# Patient Record
Sex: FEMALE | Race: WHITE | Employment: STUDENT | URBAN - METROPOLITAN AREA
[De-identification: names, ages, dates, MRNs, and addresses within clinical notes are randomized per-mention and may not be internally consistent; named-entity substitution may affect disease eponyms.]

---
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
## 2025-03-01 ENCOUNTER — OFFICE VISIT (OUTPATIENT)
Dept: URGENT CARE | Facility: URGENT CARE | Age: 20
End: 2025-03-01
Payer: MEDICAID

## 2025-03-01 VITALS
WEIGHT: 155 LBS | OXYGEN SATURATION: 98 % | DIASTOLIC BLOOD PRESSURE: 71 MMHG | HEART RATE: 76 BPM | SYSTOLIC BLOOD PRESSURE: 105 MMHG | HEIGHT: 67 IN | RESPIRATION RATE: 18 BRPM | BODY MASS INDEX: 24.33 KG/M2 | TEMPERATURE: 99.2 F

## 2025-03-01 DIAGNOSIS — R42 DIZZINESS: Primary | ICD-10-CM

## 2025-03-01 DIAGNOSIS — R42 VERTIGO: ICD-10-CM

## 2025-03-01 LAB
ERYTHROCYTE [DISTWIDTH] IN BLOOD BY AUTOMATED COUNT: 14 % (ref 10–15)
HCT VFR BLD AUTO: 43.8 % (ref 35–47)
HGB BLD-MCNC: 14.3 G/DL (ref 11.7–15.7)
MCH RBC QN AUTO: 26.9 PG (ref 26.5–33)
MCHC RBC AUTO-ENTMCNC: 32.6 G/DL (ref 31.5–36.5)
MCV RBC AUTO: 83 FL (ref 78–100)
PLATELET # BLD AUTO: 344 10E3/UL (ref 150–450)
RBC # BLD AUTO: 5.31 10E6/UL (ref 3.8–5.2)
WBC # BLD AUTO: 8.1 10E3/UL (ref 4–11)

## 2025-03-01 PROCEDURE — 3078F DIAST BP <80 MM HG: CPT | Performed by: FAMILY MEDICINE

## 2025-03-01 PROCEDURE — 36415 COLL VENOUS BLD VENIPUNCTURE: CPT | Performed by: FAMILY MEDICINE

## 2025-03-01 PROCEDURE — 99214 OFFICE O/P EST MOD 30 MIN: CPT | Performed by: FAMILY MEDICINE

## 2025-03-01 PROCEDURE — 84443 ASSAY THYROID STIM HORMONE: CPT | Performed by: FAMILY MEDICINE

## 2025-03-01 PROCEDURE — 85027 COMPLETE CBC AUTOMATED: CPT | Performed by: FAMILY MEDICINE

## 2025-03-01 PROCEDURE — 93000 ELECTROCARDIOGRAM COMPLETE: CPT | Performed by: FAMILY MEDICINE

## 2025-03-01 PROCEDURE — 3074F SYST BP LT 130 MM HG: CPT | Performed by: FAMILY MEDICINE

## 2025-03-01 PROCEDURE — 80048 BASIC METABOLIC PNL TOTAL CA: CPT | Performed by: FAMILY MEDICINE

## 2025-03-01 NOTE — PROGRESS NOTES
"  Assessment & Plan     Dizziness  19-year-old female presented with lightheadedness, dizziness which she has been experiencing for last 3 days.  No systemic symptoms.  Physical examination as described.  Differentials in detail including psychological, metabolic, cardiovascular and neurological etiology.  CBC and EKG unremarkable, no orthostatic blood pressure drop.  BMP ordered for further evaluation.  Shared decision made to continue conservative management for now.  Instructed to go ER if symptoms worsen, otherwise follow-up with PCP next week..  Patient, mother understood and in agreement with above plan.  All questions answered.  - CBC with platelets; Future  - EKG 12-lead complete w/read - Clinics  - Basic metabolic panel  (Ca, Cl, CO2, Creat, Gluc, K, Na, BUN); Future  - EKG 12-lead complete w/read - Clinics  - CBC with platelets  - Basic metabolic panel  (Ca, Cl, CO2, Creat, Gluc, K, Na, BUN)      Angel Guy is a 19 year old, presenting for the following health issues:  Urgent Care (Pt in clinic to have eval for dizziness lasting 3 days.)    HPI      Concern -   Onset: 3 days   Description: feeling dizzy and lightheaded   Intensity: moderate  Progression of Symptoms:  same  Accompanying Signs & Symptoms: No fever, chills, cough, sore throat, headache, visual disturbance, speech difficulties, chest pain, palpitations, cough, shortness of breath, abdominal pain, diarrhea, constipation, abnormal vaginal bleeding, limb weakness or other relevant systemic symptoms.  Previous history of similar problem: none  Therapies tried and outcome: None      Review of Systems  Constitutional, HEENT, cardiovascular, pulmonary, gi and gu systems are negative, except as otherwise noted.      Objective    /71   Pulse 76   Temp 99.2  F (37.3  C) (Temporal)   Resp 18   Ht 1.702 m (5' 7\")   Wt 70.3 kg (155 lb)   LMP 02/03/2025   SpO2 97%   BMI 24.28 kg/m    Body mass index is 24.28 kg/m .  Physical Exam "   GENERAL: alert and no distress  EYES: Eyes grossly normal to inspection, PERRL and conjunctivae and sclerae normal  HENT: normal cephalic/atraumatic, ear canals and TM's normal, nose and mouth without ulcers or lesions, oropharynx clear, and oral mucous membranes moist  NECK: no adenopathy, no asymmetry, masses, or scars  RESP: lungs clear to auscultation - no rales, rhonchi or wheezes  CV: regular rate and rhythm, normal S1 S2, no S3 or S4, no murmur, click or rub, no peripheral edema  ABDOMEN: soft, nontender, no hepatosplenomegaly, no masses  MS: no gross musculoskeletal defects noted, no edema  SKIN: no suspicious lesions or rashes  NEURO: Normal strength and tone, mentation intact and speech normal, cranial nerve II to XII intact  PSYCH: mentation appears normal, anxious, and judgement and insight intact      Results for orders placed or performed in visit on 03/01/25   CBC with platelets     Status: Abnormal   Result Value Ref Range    WBC Count 8.1 4.0 - 11.0 10e3/uL    RBC Count 5.31 (H) 3.80 - 5.20 10e6/uL    Hemoglobin 14.3 11.7 - 15.7 g/dL    Hematocrit 43.8 35.0 - 47.0 %    MCV 83 78 - 100 fL    MCH 26.9 26.5 - 33.0 pg    MCHC 32.6 31.5 - 36.5 g/dL    RDW 14.0 10.0 - 15.0 %    Platelet Count 344 150 - 450 10e3/uL             Signed Electronically by: Alexey Landis MD

## 2025-03-02 LAB
ANION GAP SERPL CALCULATED.3IONS-SCNC: 11 MMOL/L (ref 7–15)
BUN SERPL-MCNC: 12.9 MG/DL (ref 6–20)
CALCIUM SERPL-MCNC: 10.2 MG/DL (ref 8.8–10.4)
CHLORIDE SERPL-SCNC: 103 MMOL/L (ref 98–107)
CREAT SERPL-MCNC: 0.72 MG/DL (ref 0.51–0.95)
EGFRCR SERPLBLD CKD-EPI 2021: >90 ML/MIN/1.73M2
GLUCOSE SERPL-MCNC: 85 MG/DL (ref 70–99)
HCO3 SERPL-SCNC: 25 MMOL/L (ref 22–29)
POTASSIUM SERPL-SCNC: 4.1 MMOL/L (ref 3.4–5.3)
SODIUM SERPL-SCNC: 139 MMOL/L (ref 135–145)

## 2025-03-04 ENCOUNTER — OFFICE VISIT (OUTPATIENT)
Dept: FAMILY MEDICINE | Facility: CLINIC | Age: 20
End: 2025-03-04
Payer: COMMERCIAL

## 2025-03-04 VITALS
SYSTOLIC BLOOD PRESSURE: 114 MMHG | RESPIRATION RATE: 16 BRPM | OXYGEN SATURATION: 97 % | BODY MASS INDEX: 24.4 KG/M2 | HEIGHT: 68 IN | DIASTOLIC BLOOD PRESSURE: 76 MMHG | WEIGHT: 161 LBS | HEART RATE: 83 BPM | TEMPERATURE: 99.2 F

## 2025-03-04 DIAGNOSIS — R42 VERTIGO: Primary | ICD-10-CM

## 2025-03-04 DIAGNOSIS — J45.20 MILD INTERMITTENT ASTHMA WITHOUT COMPLICATION: ICD-10-CM

## 2025-03-04 LAB — TSH SERPL DL<=0.005 MIU/L-ACNC: 1.1 UIU/ML (ref 0.5–4.3)

## 2025-03-04 PROCEDURE — G2211 COMPLEX E/M VISIT ADD ON: HCPCS | Performed by: PHYSICIAN ASSISTANT

## 2025-03-04 PROCEDURE — 3074F SYST BP LT 130 MM HG: CPT | Performed by: PHYSICIAN ASSISTANT

## 2025-03-04 PROCEDURE — 3078F DIAST BP <80 MM HG: CPT | Performed by: PHYSICIAN ASSISTANT

## 2025-03-04 PROCEDURE — 1126F AMNT PAIN NOTED NONE PRSNT: CPT | Performed by: PHYSICIAN ASSISTANT

## 2025-03-04 PROCEDURE — 99214 OFFICE O/P EST MOD 30 MIN: CPT | Performed by: PHYSICIAN ASSISTANT

## 2025-03-04 RX ORDER — ALBUTEROL SULFATE 90 UG/1
1-2 INHALANT RESPIRATORY (INHALATION) EVERY 6 HOURS PRN
Qty: 18 G | Refills: 11 | Status: SHIPPED | OUTPATIENT
Start: 2025-03-04

## 2025-03-04 ASSESSMENT — ASTHMA QUESTIONNAIRES
QUESTION_5 LAST FOUR WEEKS HOW WOULD YOU RATE YOUR ASTHMA CONTROL: COMPLETELY CONTROLLED
QUESTION_4 LAST FOUR WEEKS HOW OFTEN HAVE YOU USED YOUR RESCUE INHALER OR NEBULIZER MEDICATION (SUCH AS ALBUTEROL): ONCE A WEEK OR LESS
ACT_TOTALSCORE: 23
QUESTION_2 LAST FOUR WEEKS HOW OFTEN HAVE YOU HAD SHORTNESS OF BREATH: ONCE OR TWICE A WEEK
QUESTION_1 LAST FOUR WEEKS HOW MUCH OF THE TIME DID YOUR ASTHMA KEEP YOU FROM GETTING AS MUCH DONE AT WORK, SCHOOL OR AT HOME: NONE OF THE TIME
ACT_TOTALSCORE: 23
QUESTION_3 LAST FOUR WEEKS HOW OFTEN DID YOUR ASTHMA SYMPTOMS (WHEEZING, COUGHING, SHORTNESS OF BREATH, CHEST TIGHTNESS OR PAIN) WAKE YOU UP AT NIGHT OR EARLIER THAN USUAL IN THE MORNING: NOT AT ALL

## 2025-03-04 ASSESSMENT — PATIENT HEALTH QUESTIONNAIRE - PHQ9
SUM OF ALL RESPONSES TO PHQ QUESTIONS 1-9: 21
SUM OF ALL RESPONSES TO PHQ QUESTIONS 1-9: 21
10. IF YOU CHECKED OFF ANY PROBLEMS, HOW DIFFICULT HAVE THESE PROBLEMS MADE IT FOR YOU TO DO YOUR WORK, TAKE CARE OF THINGS AT HOME, OR GET ALONG WITH OTHER PEOPLE: SOMEWHAT DIFFICULT

## 2025-03-04 ASSESSMENT — PAIN SCALES - GENERAL: PAINLEVEL_OUTOF10: NO PAIN (0)

## 2025-03-04 NOTE — LETTER
My Asthma Action Plan  Name: Malena Mcduffie   YOB: 2005  Date: 3/4/2025   My doctor: Kehr, Kristen M   My clinic: [facility]      My Control Medicine: none        Dose:   My Rescue Medicine: Albuterol        Dose: 90   My Asthma Severity: Intermittent / Exercise Induced  Avoid your asthma triggers: upper respiratory infections and exercise or sports        GREEN ZONE   Good Control  I feel good  No cough or wheeze  Can work, sleep and play without asthma symptoms       Take your asthma control medicine every day.     If exercise triggers your asthma, take your rescue medication  15 minutes before exercise or sports, and  During exercise if you have asthma symptoms  Spacer to use with inhaler: If you have a spacer, make sure to use it with your inhaler             YELLOW ZONE Getting Worse  I have ANY of these:  I do not feel good  Cough or wheeze  Chest feels tight  Wake up at night   Keep taking your Green Zone medications  Start taking your rescue medicine:  every 20 minutes for up to 1 hour. Then every 4 hours for 24-48 hours.  If you stay in the Yellow Zone for more than 12-24 hours, contact your doctor.  If you do not return to the Green Zone in 12-24 hours or you get worse, start taking your oral steroid medicine if prescribed by your provider.           RED ZONE Medical Alert - Get Help  I have ANY of these:  I feel awful  Medicine is not helping  Breathing getting harder  Trouble walking or talking  Nose opens wide to breathe       Take your rescue medicine NOW  If your provider has prescribed an oral steroid medicine, start taking it NOW  Call your doctor NOW  If you are still in the Red Zone after 20 minutes and you have not reached your doctor:  Take your rescue medicine again and  Call 911 or go to the emergency room right away    See your regular doctor within 2 weeks of an Emergency Room or Urgent Care visit for follow-up treatment.        The above medication may  be given at school or day care?: N/A (Adult Patient)  Child can carry and use inhaler(s) at school with approval of school nurse?: N/A (Adult Patient)    Electronically signed by: Ryan Melendez MA, March 4, 2025    Annual Reminders:  Meet with Asthma Educator,  Flu Shot in the Fall, consider Pneumonia Vaccination for patients with asthma (aged 19 and older).    Pharmacy:    [facility] [id number] [facility] - [place] [address]  [id number] - [place], [place] - [address] [place] [address] [address]                    Asthma Triggers  How To Control Things That Make Your Asthma Worse    Triggers are things that make your asthma worse.  Look at the list below to help you find your triggers and what you can do about them.  You can help prevent asthma flare-ups by staying away from your triggers.      Trigger                                                          What you can do   Cigarette Smoke  Tobacco smoke can make asthma worse. Do not allow smoking in your home, car or around you.  Be sure no one smokes at a child s day care or school.  If you smoke, ask your health care provider for ways to help you quit.  Ask family members to quit too.  Ask your health care provider for a referral to Quit Plan to help you quit smoking, or call 0-641-464-PLAN.     Colds, Flu, Bronchitis  These are common triggers of asthma. Wash your hands often.  Don t touch your eyes, nose or mouth.  Get a flu shot every year.     Dust Mites  These are tiny bugs that live in cloth or carpet. They are too small to see. Wash sheets and blankets in hot water every week.   Encase pillows and mattress in dust mite proof covers.  Avoid having carpet if you can. If you have carpet, vacuum weekly.   Use a dust mask and HEPA vacuum.   Pollen and Outdoor Mold  Some people are allergic to trees, grass, or weed pollen, or molds. Try to keep your windows closed.  Limit time out doors when pollen count is high.   Ask you health care provider about taking medicine  during allergy season.     Animal Dander  Some people are allergic to skin flakes, urine or saliva from pets with fur or feathers. Keep pets with fur or feathers out of your home.    If you can t keep the pet outdoors, then keep the pet out of your bedroom.  Keep the bedroom door closed.  Keep pets off cloth furniture and away from stuffed toys.     Mice, Rats, and Cockroaches  Some people are allergic to the waste from these pests.   Cover food and garbage.  Clean up spills and food crumbs.  Store grease in the refrigerator.   Keep food out of the bedroom.   Indoor Mold  This can be a trigger if your home has high moisture. Fix leaking faucets, pipes, or other sources of water.   Clean moldy surfaces.  Dehumidify basement if it is damp and smelly.   Smoke, Strong Odors, and Sprays  These can reduce air quality. Stay away from strong odors and sprays, such as perfume, powder, hair spray, paints, smoke incense, paint, cleaning products, candles and new carpet.   Exercise or Sports  Some people with asthma have this trigger. Be active!  Ask your doctor about taking medicine before sports or exercise to prevent symptoms.    Warm up for 5-10 minutes before and after sports or exercise.     Other Triggers of Asthma  Cold air:  Cover your nose and mouth with a scarf.  Sometimes laughing or crying can be a trigger.  Some medicines and food can trigger asthma.

## 2025-03-04 NOTE — PATIENT INSTRUCTIONS
Okay to continue to use the meclizine as needed.     If symptoms persist or worsen, consider physical therapy, please reach out via Mychart if needed    Thyroid testing was added to blood already drawn.

## 2025-03-04 NOTE — PROGRESS NOTES
Assessment & Plan     Vertigo  I added thyroid testing to the lab tests already drawn in Urgent Care.   Her anxiety is better today. This contributed to her dizzy symptoms. Vertigo has also improved with time. Discussed common causes and information given in her After Visit Summary. If symptoms should worsen or persist, then referral for Vestibular Physical Therapy discussed. She will reach out via [employer].   - TSH with free T4 reflex; Future    Mild intermittent asthma without complication  Refill given for this stable condition.   Used before exercise.   - albuterol (PROAIR HFA/PROVENTIL HFA/VENTOLIN HFA) 108 (90 Base) MCG/ACT inhaler; Inhale 1-2 puffs into the lungs every 6 hours as needed for shortness of breath or wheezing.      The longitudinal plan of care for the diagnosis(es)/condition(s) as documented were addressed during this visit. Due to the added complexity in care, I will continue to support Malena in the subsequent management and with ongoing continuity of care.            Subjective   Malena is a 19 year old, presenting for the following health issues:  Dizziness and follow up urgent care        3/4/2025     8:43 AM   Additional Questions   Roomed by Ryan PEREZ MA     History of Present Illness       Reason for visit:  Dizziness  Symptom onset:  1-3 days ago  Symptoms include:  Dizziness, nausea, off balance, vision problems  Symptom intensity:  Moderate  Symptom progression:  Improving  Had these symptoms before:  No  What makes it worse:  Walking around a lot She is missing 1 dose(s) of medications per week.  She is not taking prescribed medications regularly due to remembering to take.        Malena is here today for follow up after recent visit in Urgent Care.  She was seen for dizziness.    BMP, CBC and EKG were normal.     She developed the dizzy / vertigo feeling 4 days ago. This exacerbated her anxiety. She describes feeling vertigo with changing positions. This started when she got out of bed.  "  The increase in anxiety made symptoms worse.   She has not had URI symptoms.   No changes with her medications or supplements.   She has been taking over the counter meclizine as needed for a few days and that has helped.   She thinks symptoms are improving even without medication. She is back at college and will have class this afternoon. She has not had a vision change. No nausea or vomiting. She feels steady on her feet and overall symptoms have nearly resolved today.     No headache reported.   No change in sensation with numbness / tingling.   No heart palpitations.   Does not feel lightheaded / no history of seizure or syncope.                 Review of Systems  Constitutional, HEENT, cardiovascular, pulmonary, GI, , musculoskeletal, neuro, skin, endocrine and psych systems are negative, except as otherwise noted.      Objective    /76   Pulse 83   Temp 99.2  F (37.3  C) (Tympanic)   Resp 16   Ht 1.715 m (5' 7.5\")   Wt 73 kg (161 lb)   LMP 02/03/2025   SpO2 97%   Breastfeeding No   BMI 24.84 kg/m    Body mass index is 24.84 kg/m .  Physical Exam   GENERAL: alert and no distress  EYES: Eyes grossly normal to inspection, PERRL and conjunctivae and sclerae normal  HENT: ear canals and TM's normal, nose and mouth without ulcers or lesions  NECK: no adenopathy, no asymmetry, masses, or scars  RESP: lungs clear to auscultation - no rales, rhonchi or wheezes  CV: regular rate and rhythm, normal S1 S2, no S3 or S4, no murmur, click or rub, no peripheral edema   MS: no gross musculoskeletal defects noted, no edema  SKIN: no suspicious lesions or rashes  NEURO: Normal strength and tone, sensory exam grossly normal, mentation intact, cranial nerves 2-12 intact, and gait normal.   PSYCH: mentation appears normal, affect normal/bright    Office Visit on 03/01/2025   Component Date Value Ref Range Status    WBC Count 03/01/2025 8.1  4.0 - 11.0 10e3/uL Final    RBC Count 03/01/2025 5.31 (H)  3.80 - 5.20 " 10e6/uL Final    Hemoglobin 03/01/2025 14.3  11.7 - 15.7 g/dL Final    Hematocrit 03/01/2025 43.8  35.0 - 47.0 % Final    MCV 03/01/2025 83  78 - 100 fL Final    MCH 03/01/2025 26.9  26.5 - 33.0 pg Final    MCHC 03/01/2025 32.6  31.5 - 36.5 g/dL Final    RDW 03/01/2025 14.0  10.0 - 15.0 % Final    Platelet Count 03/01/2025 344  150 - 450 10e3/uL Final    Sodium 03/01/2025 139  135 - 145 mmol/L Final    Potassium 03/01/2025 4.1  3.4 - 5.3 mmol/L Final    Chloride 03/01/2025 103  98 - 107 mmol/L Final    Carbon Dioxide (CO2) 03/01/2025 25  22 - 29 mmol/L Final    Anion Gap 03/01/2025 11  7 - 15 mmol/L Final    Urea Nitrogen 03/01/2025 12.9  6.0 - 20.0 mg/dL Final    Creatinine 03/01/2025 0.72  0.51 - 0.95 mg/dL Final    GFR Estimate 03/01/2025 >90  >60 mL/min/1.73m2 Final    eGFR calculated using 2021 CKD-EPI equation.    Calcium 03/01/2025 10.2  8.8 - 10.4 mg/dL Final    Glucose 03/01/2025 85  70 - 99 mg/dL Final           Signed Electronically by: Kristen M. Kehr, PA-C

## 2025-05-02 ENCOUNTER — TRANSFERRED RECORDS (OUTPATIENT)
Dept: HEALTH INFORMATION MANAGEMENT | Facility: CLINIC | Age: 20
End: 2025-05-02
Payer: COMMERCIAL

## 2025-07-30 ENCOUNTER — OFFICE VISIT (OUTPATIENT)
Dept: OPTOMETRY | Facility: CLINIC | Age: 20
End: 2025-07-30
Payer: COMMERCIAL

## 2025-07-30 DIAGNOSIS — H52.13 MYOPIA, BILATERAL: Primary | ICD-10-CM

## 2025-07-30 ASSESSMENT — REFRACTION_MANIFEST
OS_AXIS: 087
OS_CYLINDER: SPHERE
OD_CYLINDER: +0.25
OD_AXIS: 085
OS_SPHERE: -1.50
OD_SPHERE: -1.75
OD_SPHERE: -1.50
OS_CYLINDER: +0.50
OS_SPHERE: -2.25
OD_CYLINDER: SPHERE
METHOD_AUTOREFRACTION: 1

## 2025-07-30 ASSESSMENT — CONF VISUAL FIELD
OS_NORMAL: 1
OD_NORMAL: 1
OD_INFERIOR_TEMPORAL_RESTRICTION: 0
OS_INFERIOR_TEMPORAL_RESTRICTION: 0
OD_SUPERIOR_TEMPORAL_RESTRICTION: 0
OD_INFERIOR_NASAL_RESTRICTION: 0
OS_INFERIOR_NASAL_RESTRICTION: 0
OD_SUPERIOR_NASAL_RESTRICTION: 0
OS_SUPERIOR_NASAL_RESTRICTION: 0
OS_SUPERIOR_TEMPORAL_RESTRICTION: 0

## 2025-07-30 ASSESSMENT — REFRACTION_CURRENTRX
OD_DIAMETER: 14.2
OD_BRAND: PRECISION 1 DAILY
OD_SPHERE: -1.50
OS_CYLINDER: SPHERE
OS_SPHERE: -1.50
OS_BRAND: PRECISION 1 DAILY
OS_DIAMETER: 14.2
OS_BASECURVE: 8.3
OD_BASECURVE: 8.3
OD_CYLINDER: SPHERE

## 2025-07-30 ASSESSMENT — EXTERNAL EXAM - RIGHT EYE: OD_EXAM: NORMAL

## 2025-07-30 ASSESSMENT — VISUAL ACUITY
OS_PH_SC: 20/20
OS_PH_SC+: -1
OD_PH_SC+: -2
OS_SC: 20/20
OD_PH_SC: 20/25
OD_SC: 20/20
OD_SC+: -1
METHOD: SNELLEN - LINEAR
OS_SC: 20/80
OS_SC+: -1
OD_SC: 20/80

## 2025-07-30 ASSESSMENT — SLIT LAMP EXAM - LIDS
COMMENTS: NORMAL
COMMENTS: NORMAL

## 2025-07-30 ASSESSMENT — REFRACTION_WEARINGRX
OS_CYLINDER: SPHERE
OD_SPHERE: -1.25
OS_SPHERE: -1.25
OD_CYLINDER: SPHERE

## 2025-07-30 ASSESSMENT — KERATOMETRY
OS_AXISANGLE_DEGREES: 102
OD_AXISANGLE_DEGREES: 74
OD_AXISANGLE2_DEGREES: 164
OD_K2POWER_DIOPTERS: 42.25
OS_K2POWER_DIOPTERS: 42.75
OS_AXISANGLE2_DEGREES: 12
OS_K1POWER_DIOPTERS: 42.00
OD_K1POWER_DIOPTERS: 41.00

## 2025-07-30 ASSESSMENT — TONOMETRY
OS_IOP_MMHG: 21.6
OD_IOP_MMHG: 22.0
IOP_METHOD: ICARE

## 2025-07-30 ASSESSMENT — CUP TO DISC RATIO
OD_RATIO: 0.4
OS_RATIO: 0.4

## 2025-07-30 ASSESSMENT — EXTERNAL EXAM - LEFT EYE: OS_EXAM: NORMAL

## 2025-07-30 NOTE — LETTER
7/30/2025      Malena Mcduffie  [address]  [place] [address]      Dear Colleague,    Thank you for referring your patient, Malena Mcduffie, to the [facility]. Please see a copy of my visit note below.    Chief Complaint   Patient presents with     Annual Eye Exam     Would like to renew contacts- $75 fit fee ok        Previous contact lens wearer? Yes: Precision 1 Daily   Comfort of contact lenses :good  Satisfied with current lenses: Yes but does not really remember        Last Eye Exam: 2023  Dilated Previously: Yes    What are you currently using to see?  glasses and contacts- lost them    Distance Vision Acuity: Noticed gradual change in both eyes    Near Vision Acuity: Not satisfied     Eye Comfort: good  Do you use eye drops? : No        Gisela Shimon - Optometric Assistant     Medical, surgical and family histories reviewed and updated 7/30/2025.       OBJECTIVE: See Ophthalmology exam    ASSESSMENT:    ICD-10-CM    1. Myopia, bilateral  H52.13 REFRACTION     EYE EXAM (SIMPLE-NONBILLABLE)     CONTACT LENS FITTING,BILAT w/ signed waiver         PLAN:     Patient Instructions   Myopia, both eyes: Updated glasses and CL prescriptions for full-time wear.  Ordered Precision 1 Daily trials.  Reviewed CL care/hygiene and wear schedule.  Patient demonstrated proficient I&R today.  Instructed patient to discontinue CL wear and seek care in the event of eye redness or pain.  Monitor annually.     Latoya Cochran, ARIELA                Again, thank you for allowing me to participate in the care of your patient.        Sincerely,        Latoya Cochran, OD    Electronically signed

## 2025-07-30 NOTE — PATIENT INSTRUCTIONS
Myopia, both eyes: Updated glasses and CL prescriptions for full-time wear.  Ordered Precision 1 Daily trials.  Reviewed CL care/hygiene and wear schedule.  Patient demonstrated proficient I&R today.  Instructed patient to discontinue CL wear and seek care in the event of eye redness or pain.  Monitor annually.

## 2025-07-30 NOTE — PROGRESS NOTES
Chief Complaint   Patient presents with    Annual Eye Exam     Would like to renew contacts- $75 fit fee ok        Previous contact lens wearer? Yes: Precision 1 Daily   Comfort of contact lenses :good  Satisfied with current lenses: Yes but does not really remember        Last Eye Exam: 2023  Dilated Previously: Yes    What are you currently using to see?  glasses and contacts- lost them    Distance Vision Acuity: Noticed gradual change in both eyes    Near Vision Acuity: Not satisfied     Eye Comfort: good  Do you use eye drops? : No        Denelle Shimon - Optometric Assistant     Medical, surgical and family histories reviewed and updated 7/30/2025.       OBJECTIVE: See Ophthalmology exam    ASSESSMENT:    ICD-10-CM    1. Myopia, bilateral  H52.13 REFRACTION     EYE EXAM (SIMPLE-NONBILLABLE)     CONTACT LENS FITTING,BILAT w/ signed waiver         PLAN:     Patient Instructions   Myopia, both eyes: Updated glasses and CL prescriptions for full-time wear.  Ordered Precision 1 Daily trials.  Reviewed CL care/hygiene and wear schedule.  Patient demonstrated proficient I&R today.  Instructed patient to discontinue CL wear and seek care in the event of eye redness or pain.  Monitor annually.     Latoya Cochran, OD

## 2025-08-03 SDOH — HEALTH STABILITY: PHYSICAL HEALTH: ON AVERAGE, HOW MANY MINUTES DO YOU ENGAGE IN EXERCISE AT THIS LEVEL?: 0 MIN

## 2025-08-03 SDOH — HEALTH STABILITY: PHYSICAL HEALTH: ON AVERAGE, HOW MANY DAYS PER WEEK DO YOU ENGAGE IN MODERATE TO STRENUOUS EXERCISE (LIKE A BRISK WALK)?: 0 DAYS

## 2025-08-03 ASSESSMENT — PATIENT HEALTH QUESTIONNAIRE - PHQ9: SUM OF ALL RESPONSES TO PHQ QUESTIONS 1-9: 23

## 2025-08-04 ENCOUNTER — OFFICE VISIT (OUTPATIENT)
Dept: FAMILY MEDICINE | Facility: CLINIC | Age: 20
End: 2025-08-04
Payer: COMMERCIAL

## 2025-08-04 VITALS
RESPIRATION RATE: 16 BRPM | OXYGEN SATURATION: 96 % | WEIGHT: 160 LBS | SYSTOLIC BLOOD PRESSURE: 107 MMHG | BODY MASS INDEX: 24.25 KG/M2 | TEMPERATURE: 98.8 F | DIASTOLIC BLOOD PRESSURE: 70 MMHG | HEIGHT: 68 IN | HEART RATE: 77 BPM

## 2025-08-04 DIAGNOSIS — F41.1 GAD (GENERALIZED ANXIETY DISORDER): ICD-10-CM

## 2025-08-04 DIAGNOSIS — Z00.00 ROUTINE GENERAL MEDICAL EXAMINATION AT A HEALTH CARE FACILITY: Primary | ICD-10-CM

## 2025-08-04 DIAGNOSIS — J45.20 MILD INTERMITTENT ASTHMA WITHOUT COMPLICATION: ICD-10-CM

## 2025-08-04 DIAGNOSIS — F90.9 ATTENTION DEFICIT HYPERACTIVITY DISORDER (ADHD), UNSPECIFIED ADHD TYPE: ICD-10-CM

## 2025-08-04 PROCEDURE — 3078F DIAST BP <80 MM HG: CPT | Performed by: PHYSICIAN ASSISTANT

## 2025-08-04 PROCEDURE — G2211 COMPLEX E/M VISIT ADD ON: HCPCS | Performed by: PHYSICIAN ASSISTANT

## 2025-08-04 PROCEDURE — 90677 PCV20 VACCINE IM: CPT | Performed by: PHYSICIAN ASSISTANT

## 2025-08-04 PROCEDURE — 99213 OFFICE O/P EST LOW 20 MIN: CPT | Mod: 25 | Performed by: PHYSICIAN ASSISTANT

## 2025-08-04 PROCEDURE — 3074F SYST BP LT 130 MM HG: CPT | Performed by: PHYSICIAN ASSISTANT

## 2025-08-04 PROCEDURE — 1126F AMNT PAIN NOTED NONE PRSNT: CPT | Performed by: PHYSICIAN ASSISTANT

## 2025-08-04 PROCEDURE — 99395 PREV VISIT EST AGE 18-39: CPT | Mod: 25 | Performed by: PHYSICIAN ASSISTANT

## 2025-08-04 PROCEDURE — 96127 BRIEF EMOTIONAL/BEHAV ASSMT: CPT | Performed by: PHYSICIAN ASSISTANT

## 2025-08-04 PROCEDURE — 90471 IMMUNIZATION ADMIN: CPT | Performed by: PHYSICIAN ASSISTANT

## 2025-08-04 RX ORDER — ALBUTEROL SULFATE 90 UG/1
1-2 INHALANT RESPIRATORY (INHALATION) EVERY 6 HOURS PRN
Qty: 18 G | Refills: 11 | Status: SHIPPED | OUTPATIENT
Start: 2025-08-04

## 2025-08-04 SDOH — HEALTH STABILITY: PHYSICAL HEALTH: ON AVERAGE, HOW MANY MINUTES DO YOU ENGAGE IN EXERCISE AT THIS LEVEL?: 0 MIN

## 2025-08-04 SDOH — HEALTH STABILITY: PHYSICAL HEALTH: ON AVERAGE, HOW MANY DAYS PER WEEK DO YOU ENGAGE IN MODERATE TO STRENUOUS EXERCISE (LIKE A BRISK WALK)?: 0 DAYS

## 2025-08-04 ASSESSMENT — PAIN SCALES - GENERAL: PAINLEVEL_OUTOF10: NO PAIN (0)

## 2025-08-04 ASSESSMENT — PATIENT HEALTH QUESTIONNAIRE - PHQ9
10. IF YOU CHECKED OFF ANY PROBLEMS, HOW DIFFICULT HAVE THESE PROBLEMS MADE IT FOR YOU TO DO YOUR WORK, TAKE CARE OF THINGS AT HOME, OR GET ALONG WITH OTHER PEOPLE: SOMEWHAT DIFFICULT
SUM OF ALL RESPONSES TO PHQ QUESTIONS 1-9: 23

## 2025-08-04 ASSESSMENT — SOCIAL DETERMINANTS OF HEALTH (SDOH): HOW OFTEN DO YOU GET TOGETHER WITH FRIENDS OR RELATIVES?: ONCE A WEEK

## (undated) DEVICE — Device

## (undated) DEVICE — SOL WATER IRRIG 1000ML BOTTLE 2F7114

## (undated) DEVICE — DECANTER TRANSFER DEVICE 2008S

## (undated) DEVICE — DRSG PRIMAPORE 02X3" 7133

## (undated) DEVICE — TUBING INSUFFLATION W/FILTER 10FT GS1016

## (undated) DEVICE — BLADE KNIFE SURG 11 371111

## (undated) DEVICE — GLOVE PROTEXIS W/NEU-THERA 8.0  2D73TE80

## (undated) DEVICE — PREP TECHNI-CARE CHLOROXYLENOL 3% 4OZ BOTTLE C222-4ZWO

## (undated) DEVICE — ANTIFOG SOLUTION W/FOAM PAD 31142527

## (undated) DEVICE — ENDO TROCAR SHIELDED BLADED KII Z-THRD 12X100MM CTB73

## (undated) DEVICE — LINEN TOWEL PACK X30 5481

## (undated) DEVICE — GLOVE PROTEXIS MICRO 7.5  2D73PM75

## (undated) DEVICE — SOL NACL 0.9% IRRIG 1000ML BOTTLE 2F7124

## (undated) DEVICE — STRAP KNEE/BODY 31143004

## (undated) DEVICE — GLOVE PROTEXIS BLUE W/NEU-THERA 8.0  2D73EB80

## (undated) DEVICE — ENDO TROCAR 12MM VERSASTEP VS101012P

## (undated) DEVICE — SU MONOCRYL 4-0 PS-2 18" UND Y496G

## (undated) DEVICE — ENDO TROCAR 05MM VERSASTEP VS101005

## (undated) DEVICE — ESU GROUND PAD UNIVERSAL W/O CORD

## (undated) DEVICE — SU VICRYL 0 ENDOLOOP 18" EJ10

## (undated) DEVICE — NDL INSUFFLATION 14GA STEP SHORT S110000

## (undated) DEVICE — STPL POWERED ECHELON 60MM PSEE60A

## (undated) DEVICE — SU VICRYL 2-0 UR-6 27" J602H

## (undated) RX ORDER — DEXAMETHASONE SODIUM PHOSPHATE 4 MG/ML
INJECTION, SOLUTION INTRA-ARTICULAR; INTRALESIONAL; INTRAMUSCULAR; INTRAVENOUS; SOFT TISSUE
Status: DISPENSED
Start: 2018-12-29

## (undated) RX ORDER — ACETAMINOPHEN 325 MG/1
TABLET ORAL
Status: DISPENSED
Start: 2018-12-29

## (undated) RX ORDER — ONDANSETRON 2 MG/ML
INJECTION INTRAMUSCULAR; INTRAVENOUS
Status: DISPENSED
Start: 2018-12-29

## (undated) RX ORDER — HYDROMORPHONE HYDROCHLORIDE 1 MG/ML
INJECTION, SOLUTION INTRAMUSCULAR; INTRAVENOUS; SUBCUTANEOUS
Status: DISPENSED
Start: 2018-12-29

## (undated) RX ORDER — LIDOCAINE HYDROCHLORIDE 20 MG/ML
INJECTION, SOLUTION EPIDURAL; INFILTRATION; INTRACAUDAL; PERINEURAL
Status: DISPENSED
Start: 2018-12-29

## (undated) RX ORDER — BUPIVACAINE HYDROCHLORIDE 5 MG/ML
INJECTION, SOLUTION PERINEURAL
Status: DISPENSED
Start: 2018-12-29

## (undated) RX ORDER — OXYCODONE HYDROCHLORIDE 5 MG/1
TABLET ORAL
Status: DISPENSED
Start: 2018-12-29

## (undated) RX ORDER — FENTANYL CITRATE 50 UG/ML
INJECTION, SOLUTION INTRAMUSCULAR; INTRAVENOUS
Status: DISPENSED
Start: 2018-12-29

## (undated) RX ORDER — PROPOFOL 10 MG/ML
INJECTION, EMULSION INTRAVENOUS
Status: DISPENSED
Start: 2018-12-29

## (undated) RX ORDER — OXYCODONE HCL 5 MG/5 ML
SOLUTION, ORAL ORAL
Status: DISPENSED
Start: 2018-12-29